# Patient Record
Sex: MALE | Race: WHITE | NOT HISPANIC OR LATINO | Employment: FULL TIME | URBAN - METROPOLITAN AREA
[De-identification: names, ages, dates, MRNs, and addresses within clinical notes are randomized per-mention and may not be internally consistent; named-entity substitution may affect disease eponyms.]

---

## 2019-01-30 ENCOUNTER — OFFICE VISIT (OUTPATIENT)
Dept: URGENT CARE | Facility: PHYSICIAN GROUP | Age: 19
End: 2019-01-30
Payer: COMMERCIAL

## 2019-01-30 VITALS
HEIGHT: 72 IN | DIASTOLIC BLOOD PRESSURE: 84 MMHG | HEART RATE: 89 BPM | BODY MASS INDEX: 37.11 KG/M2 | SYSTOLIC BLOOD PRESSURE: 120 MMHG | OXYGEN SATURATION: 96 % | TEMPERATURE: 97.2 F | WEIGHT: 274 LBS

## 2019-01-30 DIAGNOSIS — F32.A ACUTE DEPRESSION: ICD-10-CM

## 2019-01-30 PROCEDURE — 99204 OFFICE O/P NEW MOD 45 MIN: CPT | Performed by: FAMILY MEDICINE

## 2019-01-30 RX ORDER — CITALOPRAM 20 MG/1
20 TABLET ORAL DAILY
Qty: 90 TAB | Refills: 1 | Status: SHIPPED | OUTPATIENT
Start: 2019-01-30 | End: 2024-01-29

## 2019-01-30 ASSESSMENT — ENCOUNTER SYMPTOMS
CHILLS: 0
NERVOUS/ANXIOUS: 0
INSOMNIA: 1
DEPRESSION: 1
FEVER: 0
ABDOMINAL PAIN: 0

## 2019-01-30 NOTE — PATIENT INSTRUCTIONS
Depression, Adult  Depression refers to feeling sad, low, down in the dumps, blue, gloomy, or empty. In general, there are two kinds of depression:  1. Normal sadness or normal grief. This kind of depression is one that we all feel from time to time after upsetting life experiences, such as the loss of a job or the ending of a relationship. This kind of depression is considered normal, is short lived, and resolves within a few days to 2 weeks. Depression experienced after the loss of a loved one (bereavement) often lasts longer than 2 weeks but normally gets better with time.  2. Clinical depression. This kind of depression lasts longer than normal sadness or normal grief or interferes with your ability to function at home, at work, and in school. It also interferes with your personal relationships. It affects almost every aspect of your life. Clinical depression is an illness.  Symptoms of depression can also be caused by conditions other than those mentioned above, such as:  · Physical illness. Some physical illnesses, including underactive thyroid gland (hypothyroidism), severe anemia, specific types of cancer, diabetes, uncontrolled seizures, heart and lung problems, strokes, and chronic pain are commonly associated with symptoms of depression.  · Side effects of some prescription medicine. In some people, certain types of medicine can cause symptoms of depression.  · Substance abuse. Abuse of alcohol and illicit drugs can cause symptoms of depression.  SYMPTOMS  Symptoms of normal sadness and normal grief include the following:  · Feeling sad or crying for short periods of time.  · Not caring about anything (apathy).  · Difficulty sleeping or sleeping too much.  · No longer able to enjoy the things you used to enjoy.  · Desire to be by oneself all the time (social isolation).  · Lack of energy or motivation.  · Difficulty concentrating or remembering.  · Change in appetite or weight.  · Restlessness or  agitation.  Symptoms of clinical depression include the same symptoms of normal sadness or normal grief and also the following symptoms:  · Feeling sad or crying all the time.  · Feelings of guilt or worthlessness.  · Feelings of hopelessness or helplessness.  · Thoughts of suicide or the desire to harm yourself (suicidal ideation).  · Loss of touch with reality (psychotic symptoms). Seeing or hearing things that are not real (hallucinations) or having false beliefs about your life or the people around you (delusions and paranoia).  DIAGNOSIS   The diagnosis of clinical depression is usually based on how bad the symptoms are and how long they have lasted. Your health care provider will also ask you questions about your medical history and substance use to find out if physical illness, use of prescription medicine, or substance abuse is causing your depression. Your health care provider may also order blood tests.  TREATMENT   Often, normal sadness and normal grief do not require treatment. However, sometimes antidepressant medicine is given for bereavement to ease the depressive symptoms until they resolve.  The treatment for clinical depression depends on how bad the symptoms are but often includes antidepressant medicine, counseling with a mental health professional, or both. Your health care provider will help to determine what treatment is best for you.  Depression caused by physical illness usually goes away with appropriate medical treatment of the illness. If prescription medicine is causing depression, talk with your health care provider about stopping the medicine, decreasing the dose, or changing to another medicine.  Depression caused by the abuse of alcohol or illicit drugs goes away when you stop using these substances. Some adults need professional help in order to stop drinking or using drugs.  SEEK IMMEDIATE MEDICAL CARE IF:  · You have thoughts about hurting yourself or others.  · You lose touch  with reality (have psychotic symptoms).  · You are taking medicine for depression and have a serious side effect.  FOR MORE INFORMATION  · National [place] on Mental Illness: www.magdy.org   · National [place] of Mental Health: www.nimh.nih.gov      This information is not intended to replace advice given to you by your health care provider. Make sure you discuss any questions you have with your health care provider.     Document Released: 12/15/2001 Document Revised: 01/08/2016 Document Reviewed: 03/18/2013  Elsevier Interactive Patient Education ©2016 [employer]

## 2019-01-30 NOTE — LETTER
January 30, 2019         Patient: Robel Lora   YOB: 2000   Date of Visit: 1/30/2019           To Whom it May Concern:    Robel Lora was seen in my clinic on 1/30/2019. He may return to work on 1/30/2019..    If you have any questions or concerns, please don't hesitate to call.        Sincerely,           Garcia Martin M.D.  Electronically Signed

## 2019-01-30 NOTE — PROGRESS NOTES
Subjective:     Robel Lora is a 18 y.o. male who presents for Depressed (x 1 week, depression has come and gone for years)       Depression   This is a recurrent problem. The current episode started in the past 7 days. The problem occurs constantly. The problem has been rapidly worsening. Pertinent negatives include no abdominal pain, chest pain, chills or fever.     Past Medical History:   Diagnosis Date   • Depression    History reviewed. No pertinent surgical history.  Social History     Social History   • Marital status: Single     Spouse name: N/A   • Number of children: N/A   • Years of education: N/A     Occupational History   • Not on file.     Social History Main Topics   • Smoking status: Never Smoker   • Smokeless tobacco: Never Used   • Alcohol use No   • Drug use: Yes     Types: Marijuana      Comment: on occasion   • Sexual activity: Not on file     Other Topics Concern   • Not on file     Social History Narrative   • No narrative on file      Family History   Problem Relation Age of Onset   • Stroke Neg Hx    • Heart Disease Neg Hx     Review of Systems   Constitutional: Negative for chills and fever.   Cardiovascular: Negative for chest pain.   Gastrointestinal: Negative for abdominal pain.   Psychiatric/Behavioral: Positive for depression. Negative for suicidal ideas. The patient has insomnia. The patient is not nervous/anxious.      Allergies   Allergen Reactions   • Penicillins      Rash      Objective:   /84   Pulse 89   Temp 36.2 °C (97.2 °F) (Temporal)   Ht 1.829 m (6')   Wt 124.3 kg (274 lb)   SpO2 96%   BMI 37.16 kg/m²   Physical Exam   Constitutional: He is oriented to person, place, and time. He appears well-developed and well-nourished. No distress.   HENT:   Head: Normocephalic and atraumatic.   Eyes: Pupils are equal, round, and reactive to light. Conjunctivae and EOM are normal.   Cardiovascular: Normal rate and regular rhythm.    No murmur heard.  Pulmonary/Chest:  Effort normal and breath sounds normal. No respiratory distress.   Abdominal: Soft. He exhibits no distension. There is no tenderness.   Neurological: He is alert and oriented to person, place, and time. He has normal reflexes. No sensory deficit.   Skin: Skin is warm and dry.   Psychiatric: He has a normal mood and affect.         Assessment/Plan:   Assessment    1. Acute depression  - REFERRAL TO PSYCHIATRY  - citalopram (CELEXA) 20 MG Tab; Take 1 Tab by mouth every day.  Dispense: 90 Tab; Refill: 1  - REFERRAL TO FOLLOW-UP WITH PRIMARY CARE    Differential diagnosis, natural history, supportive care, and indications for immediate follow-up discussed.

## 2019-11-08 ENCOUNTER — OFFICE VISIT (OUTPATIENT)
Dept: URGENT CARE | Facility: PHYSICIAN GROUP | Age: 19
End: 2019-11-08

## 2019-11-08 VITALS
OXYGEN SATURATION: 97 % | RESPIRATION RATE: 20 BRPM | TEMPERATURE: 98.4 F | DIASTOLIC BLOOD PRESSURE: 84 MMHG | SYSTOLIC BLOOD PRESSURE: 124 MMHG | HEART RATE: 77 BPM

## 2019-11-08 DIAGNOSIS — R68.89 FLU-LIKE SYMPTOMS: ICD-10-CM

## 2019-11-08 LAB
FLUAV+FLUBV AG SPEC QL IA: NORMAL
INT CON NEG: NEGATIVE
INT CON POS: POSITIVE

## 2019-11-08 PROCEDURE — 87804 INFLUENZA ASSAY W/OPTIC: CPT | Performed by: PHYSICIAN ASSISTANT

## 2019-11-08 PROCEDURE — 99214 OFFICE O/P EST MOD 30 MIN: CPT | Performed by: PHYSICIAN ASSISTANT

## 2019-11-08 ASSESSMENT — ENCOUNTER SYMPTOMS
ABDOMINAL PAIN: 0
EYE PAIN: 0
SINUS PAIN: 0
EYE REDNESS: 0
COUGH: 1
WHEEZING: 0
HEADACHES: 0
DIZZINESS: 0
CHILLS: 1
VOMITING: 0
MYALGIAS: 1
SORE THROAT: 0
NAUSEA: 1
DIARRHEA: 0
EYE DISCHARGE: 0
FEVER: 1
SPUTUM PRODUCTION: 0

## 2019-11-08 NOTE — PROGRESS NOTES
Subjective:      oRbel Lora is a 19 y.o. male who presents with Cough (cold sweats,dizzy,fatigue,x 2-3 days)            HPI  19-year-old male presents to urgent care with new problem of flulike symptoms onset about 2 to 3 days ago.  Patient reports subjective fevers, chills, generalized fatigue and mild cough.  He has taken over-the-counter medications with some symptomatic relief. Denies other associated aggravating or alleviating factors.      Review of Systems   Constitutional: Positive for chills, fever and malaise/fatigue.   HENT: Negative for congestion, ear pain, sinus pain and sore throat.    Eyes: Negative for pain, discharge and redness.   Respiratory: Positive for cough. Negative for sputum production, shortness of breath and wheezing.    Cardiovascular: Negative for chest pain.   Gastrointestinal: Positive for nausea. Negative for abdominal pain, diarrhea and vomiting.   Genitourinary: Negative for dysuria.   Musculoskeletal: Positive for myalgias.   Skin: Negative for rash.   Neurological: Negative for dizziness and headaches.   Endo/Heme/Allergies: Negative for environmental allergies.       Past Medical History:   Diagnosis Date   • Depression      Current Outpatient Medications on File Prior to Visit   Medication Sig Dispense Refill   • citalopram (CELEXA) 20 MG Tab Take 1 Tab by mouth every day. (Patient not taking: Reported on 11/8/2019) 90 Tab 1     No current facility-administered medications on file prior to visit.      Allergies   Allergen Reactions   • Penicillins      Rash     Social History     Tobacco Use   • Smoking status: Never Smoker   • Smokeless tobacco: Never Used   Substance Use Topics   • Alcohol use: No      Objective:     /84 (BP Location: Left arm, Patient Position: Sitting, BP Cuff Size: Large adult)   Pulse 77   Temp 36.9 °C (98.4 °F) (Temporal)   Resp 20   SpO2 97%      Physical Exam  Vitals signs reviewed.   Constitutional:       General: He is not in acute  distress.     Appearance: Normal appearance. He is well-developed. He is not ill-appearing.   HENT:      Head: Normocephalic and atraumatic.      Right Ear: Tympanic membrane normal.      Left Ear: Tympanic membrane normal.      Nose: Mucosal edema, congestion and rhinorrhea present.      Mouth/Throat:      Mouth: Mucous membranes are moist.      Pharynx: Oropharynx is clear. Posterior oropharyngeal erythema present.   Eyes:      Extraocular Movements: Extraocular movements intact.      Conjunctiva/sclera: Conjunctivae normal.   Neck:      Musculoskeletal: Normal range of motion and neck supple.   Cardiovascular:      Rate and Rhythm: Normal rate and regular rhythm.      Heart sounds: Normal heart sounds.   Pulmonary:      Effort: Pulmonary effort is normal. No respiratory distress.      Breath sounds: Normal breath sounds.   Abdominal:      General: Bowel sounds are normal. There is no distension.      Palpations: Abdomen is soft.   Musculoskeletal: Normal range of motion.   Skin:     General: Skin is warm and dry.      Findings: No rash.   Neurological:      General: No focal deficit present.      Mental Status: He is alert and oriented to person, place, and time.   Psychiatric:         Mood and Affect: Mood normal.         Behavior: Behavior normal.         Thought Content: Thought content normal.         Judgment: Judgment normal.                 Assessment/Plan:     1. Flu-like symptoms  POCT Influenza A/B     Results for orders placed or performed in visit on 11/08/19   POCT Influenza A/B   Result Value Ref Range    Rapid Influenza A-B neg     Internal Control Positive Positive     Internal Control Negative Negative      Advised patient symptoms are most likely viral in etiology, recommend supportive care. Increased fluids and rest. Discussed use of over-the-counter cold and cough medications, nedi-pot, humidifier, and Flonase nasal spray for symptomatic relief.  Follow-up with PCP or return for reevaluation  if symptoms persist/worsen. The patient demonstrated a good understanding and agreed with the treatment plan.

## 2019-11-08 NOTE — LETTER
November 8, 2019         Patient: Robel Lora   YOB: 2000   Date of Visit: 11/8/2019           To Whom it May Concern:    Robel Lora was seen in my clinic on 11/8/2019. He may return to work on next scheduled shift. Please excuse his absence this week, 11/4 - 11/12.     If you have any questions or concerns, please don't hesitate to call.        Sincerely,           [facility]  Electronically Signed

## 2020-01-23 ASSESSMENT — ENCOUNTER SYMPTOMS: SHORTNESS OF BREATH: 0

## 2021-11-11 ENCOUNTER — HOSPITAL ENCOUNTER (EMERGENCY)
Facility: MEDICAL CENTER | Age: 21
End: 2021-11-11
Attending: EMERGENCY MEDICINE
Payer: COMMERCIAL

## 2021-11-11 VITALS
HEIGHT: 72 IN | BODY MASS INDEX: 42.66 KG/M2 | RESPIRATION RATE: 16 BRPM | DIASTOLIC BLOOD PRESSURE: 99 MMHG | HEART RATE: 67 BPM | OXYGEN SATURATION: 98 % | TEMPERATURE: 97.1 F | SYSTOLIC BLOOD PRESSURE: 139 MMHG | WEIGHT: 315 LBS

## 2021-11-11 DIAGNOSIS — T50.Z95A ADVERSE EFFECT OF VACCINE, INITIAL ENCOUNTER: ICD-10-CM

## 2021-11-11 DIAGNOSIS — R07.9 ACUTE CHEST PAIN: ICD-10-CM

## 2021-11-11 LAB — EKG IMPRESSION: NORMAL

## 2021-11-11 PROCEDURE — 99283 EMERGENCY DEPT VISIT LOW MDM: CPT

## 2021-11-11 PROCEDURE — 93005 ELECTROCARDIOGRAM TRACING: CPT

## 2021-11-11 PROCEDURE — 93005 ELECTROCARDIOGRAM TRACING: CPT | Performed by: EMERGENCY MEDICINE

## 2021-11-12 NOTE — ED PROVIDER NOTES
ED Provider Note    CHIEF COMPLAINT  Chief Complaint   Patient presents with   • Chest Pain     L side, radiates to L armpit; pain started after receiving the 2nd dose of the COVID vaccine in the L side arm        HPI  Robel Lora is a 21 y.o. male who presents with chest pain.  Patient received Covid vaccination yesterday.  This morning had pain and swelling in his left armpit is now moved over to his left anterior chest pectoral area.  It is worse with movement of his arm.  He feels a tender knot in his armpit.  He is not had a fever.  No pleuritic pain.  No positional symptoms.  No exertional symptoms.  No shortness of breath, cough.    REVIEW OF SYSTEMS  As per HPI, otherwise a 10 point review of systems is negative    PAST MEDICAL HISTORY  Past Medical History:   Diagnosis Date   • Depression        Family history  No pertinent family medical history    SOCIAL HISTORY  Social History     Tobacco Use   • Smoking status: Never Smoker   • Smokeless tobacco: Never Used   Vaping Use   • Vaping Use: Never used   Substance Use Topics   • Alcohol use: No   • Drug use: Yes     Types: Marijuana     Comment: on occasion       SURGICAL HISTORY  History reviewed. No pertinent surgical history.    CURRENT MEDICATIONS  Home Medications     Reviewed by Liz Dubon R.N. (Registered Nurse) on 11/11/21 at 1750  Med List Status: Not Addressed   Medication Last Dose Status   citalopram (CELEXA) 20 MG Tab  Active                ALLERGIES  Allergies   Allergen Reactions   • Penicillins      Rash       PHYSICAL EXAM  VITAL SIGNS: /68   Pulse 61   Temp 36.7 °C (98 °F) (Temporal)   Resp 16   Ht 1.829 m (6')   Wt (!) 169 kg (372 lb 12.8 oz)   SpO2 98%   BMI 50.56 kg/m²    Constitutional: Awake and alert  HENT: Normal inspection  Eyes: Normal inspection  Neck: Grossly normal range of motion.  Cardiovascular: Normal heart rate, Normal rhythm.  Symmetric peripheral pulses.   Thorax & Lungs: No respiratory  distress, No wheezing, No rales, tenderness over the left chest wall pectoral area.  Tender left axillary lymph node  Abdomen: Bowel sounds normal, soft, non-distended, nontender, no mass  Skin: No obvious rash.  Extremities: Well perfused  Neurologic: Grossly normal   Psychiatric: Normal for situation      Labs:  Results for orders placed or performed during the hospital encounter of 19   EKG   Result Value Ref Range    Report       [facility] Emergency Dept[facility]    Test Date:  2021  Pt Name:    NELIA GONSALES                Department: [facility]  MRN:        [id number]                      Room:  Gender:     Male                         Technician: ANKUSH  :        2000                   Requested By:ER TRIAGE PROTOCOL  Order #:    [id number]                    Reading MD:    Measurements  Intervals                                Axis  Rate:       56                           P:          44  NV:         164                          QRS:        -9  QRSD:       104                          T:          22  QT:         436  QTc:        421    Interpretive Statements  SINUS BRADYCARDIA  ATRIAL PREMATURE COMPLEX  No previous ECG available for comparison         COURSE & MEDICAL DECISION MAKING  Patient presents with left anterior chest wall and axillary tenderness related to Covid vaccination.  No clinical suggestion of myocarditis or pericarditis.  EKG was unremarkable.  Remainder physical exam was unremarkable.  I advised Tylenol and or ibuprofen as needed.  Warm compress.  Return to ER for difficulty breathing, worsening symptoms, pleuritic or positional pain or concern.    FINAL IMPRESSION  1.  Chest wall pain  2.  Axillary lymphadenopathy  3.  Reaction to Covid vaccination      This dictation was created using voice recognition software. The accuracy of the dictation is limited to the abilities of the software.  The nursing notes were reviewed and certain aspects of this information were  incorporated into this note.      Electronically signed by: Forest Luong M.D., 11/11/2021 6:18 PM

## 2021-11-12 NOTE — ED TRIAGE NOTES
Chief Complaint   Patient presents with   • Chest Pain     L side, radiates to L armpit; pain started after receiving the 2nd dose of the COVID vaccine in the L side arm      /68   Pulse 61   Temp 36.7 °C (98 °F) (Temporal)   Resp 16   Ht 1.829 m (6')   Wt (!) 169 kg (372 lb 12.8 oz)   SpO2 98%   BMI 50.56 kg/m²     Pt arrived w/ above concern, pt got vaccine dose over the weekend, chest pain started today; denies any SOB    Has this patient been vaccinated for COVID - YES  If not, would they like to be vaccinated while in the ER if eligible?  n/a  Would the patient like to speak with the ERP about the possibility of receiving the COVID vaccine today before making a decision? n/a'

## 2021-11-12 NOTE — ED NOTES
Discharge instructions and note for work given to pt with verbalized understanding.  Pt ambulatory out of the ER.

## 2021-11-12 NOTE — DISCHARGE INSTRUCTIONS
Tylenol and/or ibuprofen.  Warm compress.  Return to ER for worsening, not improving, difficulty breathing or concern

## 2024-01-18 ENCOUNTER — OFFICE VISIT (OUTPATIENT)
Dept: URGENT CARE | Facility: PHYSICIAN GROUP | Age: 24
End: 2024-01-18
Payer: COMMERCIAL

## 2024-01-18 ENCOUNTER — APPOINTMENT (OUTPATIENT)
Dept: URGENT CARE | Facility: PHYSICIAN GROUP | Age: 24
End: 2024-01-18
Payer: COMMERCIAL

## 2024-01-18 VITALS
OXYGEN SATURATION: 97 % | TEMPERATURE: 97.7 F | HEIGHT: 72 IN | RESPIRATION RATE: 20 BRPM | SYSTOLIC BLOOD PRESSURE: 116 MMHG | WEIGHT: 315 LBS | BODY MASS INDEX: 42.66 KG/M2 | HEART RATE: 110 BPM | DIASTOLIC BLOOD PRESSURE: 70 MMHG

## 2024-01-18 DIAGNOSIS — J02.9 PHARYNGITIS, UNSPECIFIED ETIOLOGY: ICD-10-CM

## 2024-01-18 DIAGNOSIS — B34.9 NONSPECIFIC SYNDROME SUGGESTIVE OF VIRAL ILLNESS: ICD-10-CM

## 2024-01-18 DIAGNOSIS — J02.0 STREP PHARYNGITIS: ICD-10-CM

## 2024-01-18 LAB
FLUAV RNA SPEC QL NAA+PROBE: NEGATIVE
FLUBV RNA SPEC QL NAA+PROBE: NEGATIVE
RSV RNA SPEC QL NAA+PROBE: NEGATIVE
S PYO DNA SPEC NAA+PROBE: DETECTED
SARS-COV-2 RNA RESP QL NAA+PROBE: NEGATIVE

## 2024-01-18 PROCEDURE — 3074F SYST BP LT 130 MM HG: CPT

## 2024-01-18 PROCEDURE — 0241U POCT CEPHEID COV-2, FLU A/B, RSV - PCR: CPT

## 2024-01-18 PROCEDURE — 99213 OFFICE O/P EST LOW 20 MIN: CPT

## 2024-01-18 PROCEDURE — 87651 STREP A DNA AMP PROBE: CPT

## 2024-01-18 PROCEDURE — 3078F DIAST BP <80 MM HG: CPT

## 2024-01-18 RX ORDER — CLINDAMYCIN HYDROCHLORIDE 300 MG/1
300 CAPSULE ORAL 3 TIMES DAILY
Qty: 30 CAPSULE | Refills: 0 | Status: SHIPPED | OUTPATIENT
Start: 2024-01-18 | End: 2024-01-28

## 2024-01-18 ASSESSMENT — ENCOUNTER SYMPTOMS
SORE THROAT: 1
NAUSEA: 0
SHORTNESS OF BREATH: 1
DIARRHEA: 0
COUGH: 1
FEVER: 1
VOMITING: 0
HEADACHES: 1
ABDOMINAL PAIN: 0

## 2024-01-18 NOTE — PROGRESS NOTES
CHIEF COMPLAINT  Chief Complaint   Patient presents with    Influenza     Headache, fever, sore throat, sob, fatigue, body aches, cough  X 4 days      Subjective:   Robel Lora is a 23 y.o. male who presents for flulike symptoms.  Patient reports symptoms of headache, fever, sore throat, fatigue, mild shortness of breath and bodyaches x 4 days.  He denies any nausea, vomiting or diarrhea.  He does report pertinent past medical history of aspect.  Patient does report that today he feels much improved.  He states he has been taking Tylenol, Advil and Mucinex for alleviation of symptoms.    Review of Systems   Constitutional:  Positive for fever.   HENT:  Positive for congestion and sore throat.    Respiratory:  Positive for cough and shortness of breath.    Gastrointestinal:  Negative for abdominal pain, diarrhea, nausea and vomiting.   Neurological:  Positive for headaches.       PAST MEDICAL HISTORY  There are no problems to display for this patient.      SURGICAL HISTORY  patient denies any surgical history    ALLERGIES  Allergies   Allergen Reactions    Penicillins      Rash       CURRENT MEDICATIONS  Home Medications       Reviewed by Dev Castro Ass't (Medical Assistant) on 01/18/24 at 0939  Med List Status: <None>     Medication Last Dose Status   citalopram (CELEXA) 20 MG Tab Not Taking Active                    SOCIAL HISTORY  Social History     Tobacco Use    Smoking status: Never    Smokeless tobacco: Never   Vaping Use    Vaping Use: Never used   Substance and Sexual Activity    Alcohol use: No    Drug use: Yes     Types: Marijuana     Comment: on occasion    Sexual activity: Not on file       FAMILY HISTORY  Family History   Problem Relation Age of Onset    Stroke Neg Hx     Heart Disease Neg Hx          Medications, Allergies, and current problem list reviewed today in Epic.     Objective:     /70   Pulse (!) 110   Temp 36.5 °C (97.7 °F) (Temporal)   Resp 20   Ht 1.829 m  (6')   Wt (!) 167 kg (368 lb)   SpO2 97%     Physical Exam  Vitals reviewed.   Constitutional:       General: He is not in acute distress.     Appearance: Normal appearance. He is normal weight. He is not ill-appearing or toxic-appearing.   HENT:      Head: Normocephalic.      Right Ear: Tympanic membrane normal.      Left Ear: Tympanic membrane normal.      Nose: Congestion and rhinorrhea present.      Mouth/Throat:      Mouth: Mucous membranes are moist.      Pharynx: Oropharynx is clear. No oropharyngeal exudate or posterior oropharyngeal erythema.   Cardiovascular:      Rate and Rhythm: Normal rate and regular rhythm.      Pulses: Normal pulses.      Heart sounds: Normal heart sounds.   Pulmonary:      Effort: Pulmonary effort is normal. No respiratory distress.      Breath sounds: Normal breath sounds. No stridor. No rhonchi or rales.   Musculoskeletal:      Cervical back: Normal range of motion and neck supple. No rigidity.   Lymphadenopathy:      Cervical: No cervical adenopathy.   Skin:     General: Skin is warm.      Capillary Refill: Capillary refill takes less than 2 seconds.   Neurological:      General: No focal deficit present.      Mental Status: He is alert.   Psychiatric:         Mood and Affect: Mood normal.         Assessment/Plan:     Diagnosis and associated orders:     1. Nonspecific syndrome suggestive of viral illness  POCT GROUP A STREP, PCR    POCT CoV-2, Flu A/B, RSV by PCR      2. Pharyngitis, unspecified etiology  POCT GROUP A STREP, PCR         Comments/MDM:     Upon physical exam patient is alert no apparent signs of distress.  Neck is supple, no lymphadenopathy.  Moderate pharyngeal erythema.  No exudate appreciated.  He is clear to auscultation bilaterally.  Normal respiratory effort.  Vital signs are stable in clinic.  Point-of-care viral swab completed.  Point-of-care strep test completed.  Counseled patient to continue use of OTC medications for alleviation of discomfort.   Instructed on adequate hydration and rest.  Red flag signs and symptoms discussed at length.  Instructed to return to ER or urgent care if symptoms worsen or fail to improve.         Differential diagnosis, natural history, supportive care, and indications for immediate follow-up discussed.    Advised the patient to follow-up with the primary care physician for recheck, reevaluation, and consideration of further management.    Please note that this dictation was created using voice recognition software. I have made a reasonable attempt to correct obvious errors, but I expect that there are errors of grammar and possibly content that I did not discover before finalizing the note.    This note was electronically signed by EDVIN Rios

## 2024-01-18 NOTE — PROGRESS NOTES
Called patient discussed positive strep a swab.  Patient did report significant hives and angioedema with previous doses of penicillins.  Prescription for clindamycin sent sent to preferred pharmacy for treatment of strep pharyngitis.

## 2024-01-18 NOTE — LETTER
January 18, 2024    To Whom It May Concern:         This is confirmation that Robel Lora attended his scheduled appointment with EDVIN Rios on 1/18/24.  Please excuse from work on 1/17/2024.  May return to work on 1/20/2024.         If you have any questions please do not hesitate to call me at the phone number listed below.    Sincerely,          GERALD RiosRHariN.  [phone]

## 2024-01-18 NOTE — LETTER
January 18, 2024    To Whom It May Concern:         This is confirmation that Robel Lora attended his scheduled appointment with EDVIN Rios on 1/18/24. Please absence. May return to work on 1/22/24.          If you have any questions please do not hesitate to call me at the phone number listed below.    Sincerely,          GERALD RiosRHariN.  [phone]

## 2024-01-29 ENCOUNTER — HOSPITAL ENCOUNTER (OUTPATIENT)
Dept: RADIOLOGY | Facility: MEDICAL CENTER | Age: 24
End: 2024-01-29
Attending: NURSE PRACTITIONER
Payer: COMMERCIAL

## 2024-01-29 ENCOUNTER — OFFICE VISIT (OUTPATIENT)
Dept: URGENT CARE | Facility: PHYSICIAN GROUP | Age: 24
End: 2024-01-29
Payer: COMMERCIAL

## 2024-01-29 VITALS
OXYGEN SATURATION: 98 % | TEMPERATURE: 97.4 F | BODY MASS INDEX: 42.66 KG/M2 | HEIGHT: 72 IN | DIASTOLIC BLOOD PRESSURE: 68 MMHG | RESPIRATION RATE: 19 BRPM | HEART RATE: 111 BPM | WEIGHT: 315 LBS | SYSTOLIC BLOOD PRESSURE: 116 MMHG

## 2024-01-29 DIAGNOSIS — M54.41 ACUTE RIGHT-SIDED LOW BACK PAIN WITH RIGHT-SIDED SCIATICA: ICD-10-CM

## 2024-01-29 DIAGNOSIS — M62.830 LUMBAR PARASPINAL MUSCLE SPASM: ICD-10-CM

## 2024-01-29 PROCEDURE — 3074F SYST BP LT 130 MM HG: CPT | Performed by: NURSE PRACTITIONER

## 2024-01-29 PROCEDURE — 3078F DIAST BP <80 MM HG: CPT | Performed by: NURSE PRACTITIONER

## 2024-01-29 PROCEDURE — 99213 OFFICE O/P EST LOW 20 MIN: CPT | Performed by: NURSE PRACTITIONER

## 2024-01-29 PROCEDURE — 72100 X-RAY EXAM L-S SPINE 2/3 VWS: CPT

## 2024-01-29 RX ORDER — CYCLOBENZAPRINE HCL 10 MG
10 TABLET ORAL 3 TIMES DAILY PRN
Qty: 20 TABLET | Refills: 0 | Status: SHIPPED | OUTPATIENT
Start: 2024-01-29

## 2024-01-29 RX ORDER — METHYLPREDNISOLONE 4 MG/1
TABLET ORAL
Qty: 21 TABLET | Refills: 0 | Status: SHIPPED | OUTPATIENT
Start: 2024-01-29

## 2024-01-29 ASSESSMENT — ENCOUNTER SYMPTOMS
BACK PAIN: 1
BOWEL INCONTINENCE: 0

## 2024-01-29 NOTE — LETTER
January 29, 2024    To Whom It May Concern:         This is confirmation that Robel Lora attended his scheduled appointment with EDVIN Oshea on 1/29/24.         Please excuse him from work 1/31/24.    Sincerely,      STARR Oshea.  [phone]

## 2024-01-29 NOTE — PROGRESS NOTES
Subjective     Robel Lora is a 23 y.o. male who presents with Back Pain (Woke up Thursday in pain, unsure of cause, wanted to get back MRI if possible, hurts to move walk, sitting hurts, )            Back Pain  This is a new problem. Episode onset: pt reports new onset of lower back pain that started about 5 days ago. he does admit in the days before the back pain started he was very sedentary because he was sick with strep. The pain is present in the lumbar spine and gluteal. Pertinent negatives include no bladder incontinence or bowel incontinence. Treatments tried: tried the chiropractor and it made it feel worse. taking tylenol for pain. using ice compresses. The treatment provided no relief.       Review of Systems   Gastrointestinal:  Negative for bowel incontinence.   Genitourinary:  Negative for bladder incontinence.   Musculoskeletal:  Positive for back pain.   All other systems reviewed and are negative.         Past Medical History:   Diagnosis Date    Depression     History reviewed. No pertinent surgical history.   Social History     Socioeconomic History    Marital status: Single     Spouse name: Not on file    Number of children: Not on file    Years of education: Not on file    Highest education level: Not on file   Occupational History    Not on file   Tobacco Use    Smoking status: Never    Smokeless tobacco: Never   Vaping Use    Vaping Use: Never used   Substance and Sexual Activity    Alcohol use: No    Drug use: Never     Types: Marijuana     Comment: on occasion    Sexual activity: Not on file   Other Topics Concern    Not on file   Social History Narrative    Not on file     Social Determinants of Health     Financial Resource Strain: Not on file   Food Insecurity: Not on file   Transportation Needs: Not on file   Physical Activity: Not on file   Stress: Not on file   Social Connections: Not on file   Intimate Partner Violence: Not on file   Housing Stability: Not on file          Objective     /68   Pulse (!) 111   Temp 36.3 °C (97.4 °F) (Temporal)   Resp 19   Ht 1.829 m (6')   Wt (!) 166 kg (366 lb)   SpO2 98%   BMI 49.64 kg/m²      Physical Exam  Vitals and nursing note reviewed.   Constitutional:       Appearance: Normal appearance.   HENT:      Head: Normocephalic and atraumatic.      Nose: Nose normal.      Mouth/Throat:      Mouth: Mucous membranes are moist.      Pharynx: Oropharynx is clear.   Eyes:      Extraocular Movements: Extraocular movements intact.      Pupils: Pupils are equal, round, and reactive to light.   Cardiovascular:      Rate and Rhythm: Normal rate and regular rhythm.   Pulmonary:      Effort: Pulmonary effort is normal.   Musculoskeletal:         General: Normal range of motion.      Cervical back: Normal range of motion and neck supple.        Back:    Skin:     General: Skin is warm and dry.      Capillary Refill: Capillary refill takes less than 2 seconds.   Neurological:      General: No focal deficit present.      Mental Status: He is alert and oriented to person, place, and time. Mental status is at baseline.   Psychiatric:         Mood and Affect: Mood normal.         Thought Content: Thought content normal.         Judgment: Judgment normal.                    DX-LUMBAR SPINE-2 OR 3 VIEWS  Narrative: 1/29/2024 2:36 PM    HISTORY/REASON FOR EXAM:  Atraumatic low back pain for several days.    TECHNIQUE/ EXAM DESCRIPTION AND NUMBER OF VIEWS:  3 views of the lumbar spine.    COMPARISON: None.    FINDINGS:  Alignment demonstrates a very minimal amount of levoconvex curvature of the mid lumbar spine. No spondylolisthesis on the lateral view.    The vertebral body heights are normal.    There is mild L5-S1 disc space narrowing. Other disc spaces are normal. No arthropathy is evident.    SI joints are symmetric.  Impression: 1.  There is a very minimal levoconvex curvature of the mid lumbar spine.  2.  Mild L5-S1 disc space narrowing.              Assessment & Plan        1. Acute right-sided low back pain with right-sided sciatica  - DX-LUMBAR SPINE-2 OR 3 VIEWS; Future  - methylPREDNISolone (MEDROL DOSEPAK) 4 MG Tablet Therapy Pack; Follow schedule on package instructions.  Dispense: 21 Tablet; Refill: 0    2. Lumbar paraspinal muscle spasm  - cyclobenzaprine (FLEXERIL) 10 mg Tab; Take 1 Tablet by mouth 3 times a day as needed for Muscle Spasms.  Dispense: 20 Tablet; Refill: 0       Take full course of steroids as directed  Sedating effects of flexeril discussed  Tylenol and ibuprofen as needed for pain  Warm compresses  Light exercise  Work note provided  RTC if not improving  Supportive care, differential diagnoses, and indications for immediate follow-up discussed with patient.    Pathogenesis of diagnosis discussed including typical length and natural progression.    Instructed to return to  or nearest emergency department if symptoms fail to improve, for any change in condition, further concerns, or new concerning symptoms.  Patient states understanding of the plan of care and discharge instructions.